# Patient Record
Sex: FEMALE | Race: WHITE | NOT HISPANIC OR LATINO
[De-identification: names, ages, dates, MRNs, and addresses within clinical notes are randomized per-mention and may not be internally consistent; named-entity substitution may affect disease eponyms.]

---

## 2017-12-06 ENCOUNTER — FORM ENCOUNTER (OUTPATIENT)
Age: 32
End: 2017-12-06

## 2018-12-17 ENCOUNTER — FORM ENCOUNTER (OUTPATIENT)
Age: 33
End: 2018-12-17

## 2018-12-19 ENCOUNTER — FORM ENCOUNTER (OUTPATIENT)
Age: 33
End: 2018-12-19

## 2019-08-18 ENCOUNTER — FORM ENCOUNTER (OUTPATIENT)
Age: 34
End: 2019-08-18

## 2019-08-20 ENCOUNTER — FORM ENCOUNTER (OUTPATIENT)
Age: 34
End: 2019-08-20

## 2020-04-25 ENCOUNTER — MESSAGE (OUTPATIENT)
Age: 35
End: 2020-04-25

## 2020-05-08 LAB
SARS-COV-2 IGG SERPL IA-ACNC: <0.1 INDEX
SARS-COV-2 IGG SERPL QL IA: NEGATIVE

## 2020-08-21 ENCOUNTER — APPOINTMENT (OUTPATIENT)
Dept: HUMAN REPRODUCTION | Facility: CLINIC | Age: 35
End: 2020-08-21

## 2020-10-21 ENCOUNTER — RESULT REVIEW (OUTPATIENT)
Age: 35
End: 2020-10-21

## 2020-12-09 PROBLEM — Z00.00 ENCOUNTER FOR PREVENTIVE HEALTH EXAMINATION: Noted: 2020-12-09

## 2021-04-13 ENCOUNTER — APPOINTMENT (OUTPATIENT)
Dept: NEUROLOGY | Facility: CLINIC | Age: 36
End: 2021-04-13
Payer: COMMERCIAL

## 2021-04-13 ENCOUNTER — TRANSCRIPTION ENCOUNTER (OUTPATIENT)
Age: 36
End: 2021-04-13

## 2021-04-13 VITALS
BODY MASS INDEX: 36.5 KG/M2 | TEMPERATURE: 97.34 F | WEIGHT: 206 LBS | SYSTOLIC BLOOD PRESSURE: 111 MMHG | OXYGEN SATURATION: 97 % | HEART RATE: 72 BPM | HEIGHT: 63 IN | DIASTOLIC BLOOD PRESSURE: 96 MMHG

## 2021-04-13 DIAGNOSIS — Z87.828 PERSONAL HISTORY OF OTHER (HEALED) PHYSICAL INJURY AND TRAUMA: ICD-10-CM

## 2021-04-13 PROCEDURE — 99072 ADDL SUPL MATRL&STAF TM PHE: CPT

## 2021-04-13 PROCEDURE — 99204 OFFICE O/P NEW MOD 45 MIN: CPT

## 2021-04-13 NOTE — DISCUSSION/SUMMARY
[FreeTextEntry1] : pt here today with reported hx of head trauma, one witnessed seizure episode in the past 2 years, reported hx of non epileptic seizure, and seizure like activity for the past 3 weeks\par \par - recommended to obtain most recent MRI head, cont eeg report, and most recent outpt neurology note\par - will get cont eeg\par - follow up with myself or Dr Cutler once all of the prior info is obtained\par

## 2021-04-13 NOTE — HISTORY OF PRESENT ILLNESS
[FreeTextEntry1] : Pt is 36 yo RH F with hx of head trauma/concussion and reported  non-epileptic seizure here for recent worsening seizure like spells\par \par Pt seen in the office.\par \par Pt reports hx of head trauma after falling down a flight of stairs on 6/27/2019. After this episode pt reported one episode of seizure like event while sleeping  (twitching, clinching jaws, no urinary incontinence) lasting for about 10min. Pt also reported visual agnosia soon after fall event (improved since).\par \par Aprox 1 year ago, pt was evaluated at [facility] , had 3 day cont eeg, and was determined that pt has non-epileptic seizure.    Pt also had MRI and sleep study with no significant finding\par \par Recently, pt has been having increased "seizure like spells" (aura of chest pressure, confusion) more frequent over the past 3-4 weeks. Of note, pt is under increase stress due to illness in the family

## 2021-04-13 NOTE — PHYSICAL EXAM
[FreeTextEntry1] : Neuro Exam\par MS: AAOx3, follows commands, good comprehension, fund of knowledge appears intact, no aphasia, no dysarthria\par CN:  PERRL, EOMI, peripheral vision intact, v1-v3 intact, hearing intact, no facial asymmetry, tongue & uvula midline, shoulder shrug equal strength\par Motor:  5/5 no drift, no rigidity, no abnormal atrophy\par Sensory: Lt/PP intact\par Coord: no tremors\par DTR: 2+\par \par

## 2021-04-28 ENCOUNTER — APPOINTMENT (OUTPATIENT)
Dept: NEUROLOGY | Facility: CLINIC | Age: 36
End: 2021-04-28
Payer: COMMERCIAL

## 2021-04-28 DIAGNOSIS — Z83.3 FAMILY HISTORY OF DIABETES MELLITUS: ICD-10-CM

## 2021-04-28 DIAGNOSIS — Z84.2 FAMILY HISTORY OF OTHER DISEASES OF THE GENITOURINARY SYSTEM: ICD-10-CM

## 2021-04-28 DIAGNOSIS — E78.2 MIXED HYPERLIPIDEMIA: ICD-10-CM

## 2021-04-28 DIAGNOSIS — R56.9 UNSPECIFIED CONVULSIONS: ICD-10-CM

## 2021-04-28 PROCEDURE — 99215 OFFICE O/P EST HI 40 MIN: CPT | Mod: 95
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
## 2021-04-28 NOTE — CONSULT LETTER
[Dear  ___] : Dear  [unfilled], [Courtesy Letter:] : I had the pleasure of seeing your patient, [unfilled], in my office today. [Please see my note below.] : Please see my note below. [Sincerely,] : Sincerely, [FreeTextEntry3] : Miladys Cutler MD\par [facility]

## 2021-04-28 NOTE — ASSESSMENT
[FreeTextEntry1] : -Routine and ambulatory EEG\par -MRI brain with and without contrast \par -Follow-up within two weeks (MRI and EEG scheduled for next week). \par -Anticipate that she can return to work on a part-time basis Monday May 10th, and can return to work full time May 26th. \par - no driving for three months \par - needs to continue to follow with psychiatrist/psychologist \par \par Follow-up in two weeks - TEB okay \par \par

## 2021-04-28 NOTE — DATA REVIEWED
[de-identified] : 8/21/2019: MR without contrast: [facility]: no evidence of neoplasm. Temporal lobe and temporal horn are symmetric and unremarkable. No demyelinating plaque, hemorrhage or acute infarction. No hydrocephalus.  [de-identified] : 8/19/2019 - 8/21/2019: Numerous episodes of unresponsiveness, intermittent arching and vocalizing, shaking of right arm or both legs and unsteadiness. Simultaneous EEG showed normal awake record with superimposed motion and muscle artifact. No ictal EEG changes were seen. Findings consistent with psychogenic non-epileptic attacks.

## 2021-04-28 NOTE — PHYSICAL EXAM
[FreeTextEntry1] : Neurologic Exam: \par \par Mental Status: alert and oriented, speech fluent , euthymic affect \par Face symmetric, moves all extremities equally \par

## 2021-04-30 ENCOUNTER — TRANSCRIPTION ENCOUNTER (OUTPATIENT)
Age: 36
End: 2021-04-30

## 2021-05-03 ENCOUNTER — TRANSCRIPTION ENCOUNTER (OUTPATIENT)
Age: 36
End: 2021-05-03

## 2021-05-04 ENCOUNTER — TRANSCRIPTION ENCOUNTER (OUTPATIENT)
Age: 36
End: 2021-05-04

## 2021-05-06 ENCOUNTER — APPOINTMENT (OUTPATIENT)
Dept: NEUROLOGY | Facility: CLINIC | Age: 36
End: 2021-05-06
Payer: COMMERCIAL

## 2021-05-06 PROCEDURE — 99072 ADDL SUPL MATRL&STAF TM PHE: CPT

## 2021-05-06 PROCEDURE — 95819 EEG AWAKE AND ASLEEP: CPT

## 2021-05-07 ENCOUNTER — APPOINTMENT (OUTPATIENT)
Dept: NEUROLOGY | Facility: CLINIC | Age: 36
End: 2021-05-07

## 2021-05-07 PROCEDURE — 95719 EEG PHYS/QHP EA INCR W/O VID: CPT

## 2021-05-07 PROCEDURE — 95708 EEG WO VID EA 12-26HR UNMNTR: CPT

## 2021-05-07 PROCEDURE — 95700 EEG CONT REC W/VID EEG TECH: CPT

## 2021-05-07 PROCEDURE — 99072 ADDL SUPL MATRL&STAF TM PHE: CPT

## 2021-05-12 ENCOUNTER — APPOINTMENT (OUTPATIENT)
Dept: NEUROLOGY | Facility: CLINIC | Age: 36
End: 2021-05-12
Payer: COMMERCIAL

## 2021-05-12 DIAGNOSIS — R56.9 UNSPECIFIED CONVULSIONS: ICD-10-CM

## 2021-05-12 PROCEDURE — 99214 OFFICE O/P EST MOD 30 MIN: CPT | Mod: 95

## 2021-05-13 PROBLEM — R56.9 SEIZURE-LIKE ACTIVITY: Status: ACTIVE | Noted: 2021-04-13
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
## 2021-05-13 NOTE — ASSESSMENT
[FreeTextEntry1] : Unclear if she has psychogenic attacks or psychogenic attacks and epilepsy. Suspect these are PNEA given history, documented similar events during an EMU where EEG showed no correlate (consistent with PNEA) - 8/2019 and given her description of events.  MRI brain pending. \par \par Plan: \par Cleared to go back to work next week on a part-time basis.\par The following week, she can work on a full-time basis. \par Will follow-up MRI \par aEEG was normal - will repeat in 3 months \par No driving for six months after the event ([facility] - provided she has no further events). At this time it will be preferred for her to drive only short distances, during daytime and to avoid highways if possible. \par  I will START or STAY ON the medications listed below when I get home from the hospital:  None
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
## 2021-05-13 NOTE — PHYSICAL EXAM
[FreeTextEntry1] : Telehealth visit: \par \par No overt seizure like activity. Euthymic affect. Good historian. Speech fluent.\par

## 2021-05-13 NOTE — DATA REVIEWED
[de-identified] : 8/21/2019: MR without contrast: [facility]: no evidence of neoplasm. Temporal lobe and temporal horn are symmetric and unremarkable. No demyelinating plaque, hemorrhage or acute infarction. No hydrocephalus.  [de-identified] : 8/19/2019 - 8/21/2019: Numerous episodes of unresponsiveness, intermittent arching and vocalizing, shaking of right arm or both legs and unsteadiness. Simultaneous EEG showed normal awake record with superimposed motion and muscle artifact. No ictal EEG changes were seen. Findings consistent with psychogenic non-epileptic attacks. \par 5/6/21 Ambulatory EEG: Normal EEG. There were occasional left frontal Fp1>Fp2 sharply contoured delta waves which were not definitely epileptiform in N2 sleep and embedded in K-complexes.

## 2021-05-13 NOTE — CONSULT LETTER
[Dear  ___] : Dear  [unfilled], [Courtesy Letter:] : I had the pleasure of seeing your patient, [unfilled], in my office today. [Please see my note below.] : Please see my note below. [Sincerely,] : Sincerely, [FreeTextEntry3] : Yumiko Cutler MD \par [facility]

## 2021-05-28 ENCOUNTER — TRANSCRIPTION ENCOUNTER (OUTPATIENT)
Age: 36
End: 2021-05-28

## 2021-10-01 ENCOUNTER — APPOINTMENT (OUTPATIENT)
Dept: INFUSION THERAPY | Facility: HOSPITAL | Age: 36
End: 2021-10-01

## 2021-12-09 ENCOUNTER — TRANSCRIPTION ENCOUNTER (OUTPATIENT)
Age: 36
End: 2021-12-09

## 2022-01-18 ENCOUNTER — TRANSCRIPTION ENCOUNTER (OUTPATIENT)
Age: 37
End: 2022-01-18